# Patient Record
Sex: FEMALE | Race: WHITE | Employment: OTHER | URBAN - METROPOLITAN AREA
[De-identification: names, ages, dates, MRNs, and addresses within clinical notes are randomized per-mention and may not be internally consistent; named-entity substitution may affect disease eponyms.]

---

## 2020-11-08 ENCOUNTER — HOSPITAL ENCOUNTER (OUTPATIENT)
Age: 50
Discharge: ANOTHER HOSPITAL | End: 2020-11-08
Payer: COMMERCIAL

## 2020-11-08 VITALS
HEART RATE: 102 BPM | RESPIRATION RATE: 18 BRPM | TEMPERATURE: 97 F | DIASTOLIC BLOOD PRESSURE: 64 MMHG | SYSTOLIC BLOOD PRESSURE: 106 MMHG | BODY MASS INDEX: 21.69 KG/M2 | OXYGEN SATURATION: 98 % | HEIGHT: 66 IN | WEIGHT: 135 LBS

## 2020-11-08 DIAGNOSIS — R10.9 ABDOMINAL PAIN, ACUTE: ICD-10-CM

## 2020-11-08 DIAGNOSIS — R10.9 FLANK PAIN: ICD-10-CM

## 2020-11-08 DIAGNOSIS — R50.9 FEVER, UNSPECIFIED FEVER CAUSE: Primary | ICD-10-CM

## 2020-11-08 PROCEDURE — 99202 OFFICE O/P NEW SF 15 MIN: CPT | Performed by: NURSE PRACTITIONER

## 2020-11-08 PROCEDURE — 81002 URINALYSIS NONAUTO W/O SCOPE: CPT | Performed by: NURSE PRACTITIONER

## 2020-11-08 PROCEDURE — 81025 URINE PREGNANCY TEST: CPT | Performed by: NURSE PRACTITIONER

## 2020-11-08 RX ORDER — NITROFURANTOIN 25; 75 MG/1; MG/1
CAPSULE ORAL
COMMUNITY
Start: 2020-11-06

## 2020-11-08 NOTE — ED NOTES
Per provider recommendation pt will go to [facility] for further evaluation of symptoms. No acute distress noted leaving IC stable.

## 2020-11-08 NOTE — ED INITIAL ASSESSMENT (HPI)
Per pt diagnosed with UTI on Friday taking antibiotics but now having right flank pain and fevers. Pt also reports has been having right groin pain for a month.

## 2020-11-08 NOTE — ED PROVIDER NOTES
Patient presents with:  Urinary Symptoms      HPI:     Homer Michael is a 53year old female who presents with a chief complaint of dysuria, urgency, right-sided abdominal discomfort, and right flank pain that started a few days ago.   She states she actually had Not on file    Lifestyle      Physical activity        Days per week: Not on file        Minutes per session: Not on file      Stress: Not on file    Relationships      Social connections        Talks on phone: Not on file        Gets together: Not on file the past 10 hour(s))   [facility] POCT URINALYSIS DIPSTICK    Collection Time: 11/08/20  8:55 AM   Result Value Ref Range    Urine Color Dark yellow Yellow    Urine Clarity Clear Clear    Specific Gravity, Urine 1.010 1.005 - 1.030    PH, Urine 6.5 5.0 - 8.0    P

## 2021-12-17 ENCOUNTER — LAB REQUISITION (OUTPATIENT)
Dept: LAB | Facility: HOSPITAL | Age: 51
End: 2021-12-17
Payer: COMMERCIAL

## 2021-12-17 DIAGNOSIS — Z00.00 ENCOUNTER FOR GENERAL ADULT MEDICAL EXAMINATION WITHOUT ABNORMAL FINDINGS: ICD-10-CM

## 2021-12-17 PROCEDURE — 80053 COMPREHEN METABOLIC PANEL: CPT | Performed by: FAMILY MEDICINE

## 2021-12-17 PROCEDURE — 80061 LIPID PANEL: CPT | Performed by: FAMILY MEDICINE

## 2021-12-17 PROCEDURE — 85025 COMPLETE CBC W/AUTO DIFF WBC: CPT | Performed by: FAMILY MEDICINE

## 2022-04-23 ENCOUNTER — HOSPITAL ENCOUNTER (OUTPATIENT)
Age: 52
Discharge: HOME | End: 2022-04-23
Payer: COMMERCIAL

## 2022-04-23 VITALS
RESPIRATION RATE: 16 BRPM | WEIGHT: 135 LBS | HEIGHT: 66 IN | DIASTOLIC BLOOD PRESSURE: 62 MMHG | BODY MASS INDEX: 21.69 KG/M2 | HEART RATE: 61 BPM | TEMPERATURE: 99 F | OXYGEN SATURATION: 100 % | SYSTOLIC BLOOD PRESSURE: 108 MMHG

## 2022-04-23 DIAGNOSIS — N30.90 CYSTITIS: Primary | ICD-10-CM

## 2022-04-23 LAB
BILIRUB UR QL STRIP: NEGATIVE
CLARITY UR: CLEAR
COLOR UR: YELLOW
GLUCOSE UR STRIP-MCNC: NEGATIVE MG/DL
HGB UR QL STRIP: NEGATIVE
KETONES UR STRIP-MCNC: NEGATIVE MG/DL
NITRITE UR QL STRIP: POSITIVE
PH UR STRIP: 8.5 [PH]
SP GR UR STRIP: 1.01
UROBILINOGEN UR STRIP-ACNC: <2 MG/DL

## 2022-04-23 PROCEDURE — 87186 SC STD MICRODIL/AGAR DIL: CPT | Performed by: NURSE PRACTITIONER

## 2022-04-23 PROCEDURE — 87086 URINE CULTURE/COLONY COUNT: CPT | Performed by: NURSE PRACTITIONER

## 2022-04-23 PROCEDURE — 87077 CULTURE AEROBIC IDENTIFY: CPT | Performed by: NURSE PRACTITIONER

## 2022-04-23 RX ORDER — CEPHALEXIN 500 MG/1
500 CAPSULE ORAL 2 TIMES DAILY
Qty: 14 CAPSULE | Refills: 0 | Status: SHIPPED | OUTPATIENT
Start: 2022-04-23 | End: 2022-04-30

## 2022-04-23 NOTE — ED INITIAL ASSESSMENT (HPI)
Pt presents with urinary urgency, frequency and low pelvic pain x 24 hours. No low back pain reported. No vaginal discharge.      Pt reports taking a home UTI test - \"It was positive\", per pt

## 2022-07-14 ENCOUNTER — HOSPITAL ENCOUNTER (OUTPATIENT)
Age: 52
Discharge: HOME | End: 2022-07-14
Payer: COMMERCIAL

## 2022-07-14 ENCOUNTER — APPOINTMENT (OUTPATIENT)
Dept: ULTRASOUND IMAGING | Age: 52
End: 2022-07-14
Attending: NURSE PRACTITIONER
Payer: COMMERCIAL

## 2022-07-14 VITALS
RESPIRATION RATE: 20 BRPM | SYSTOLIC BLOOD PRESSURE: 133 MMHG | OXYGEN SATURATION: 100 % | HEART RATE: 77 BPM | TEMPERATURE: 99 F | DIASTOLIC BLOOD PRESSURE: 92 MMHG

## 2022-07-14 DIAGNOSIS — S86.111A RUPTURE OF MEDIAL HEAD OF RIGHT GASTROCNEMIUS, INITIAL ENCOUNTER: Primary | ICD-10-CM

## 2022-07-14 PROCEDURE — 93971 EXTREMITY STUDY: CPT | Performed by: NURSE PRACTITIONER

## 2022-07-14 NOTE — ED INITIAL ASSESSMENT (HPI)
Pt came in due to right leg swelling and pain for the past 4 days. Pt is very active and practices martial arts and is unsure if she injured herself. Pt has easy non labored respirations.

## 2024-09-03 ENCOUNTER — OFFICE VISIT (OUTPATIENT)
Dept: FAMILY MEDICINE CLINIC | Facility: CLINIC | Age: 54
End: 2024-09-03

## 2024-09-03 VITALS
DIASTOLIC BLOOD PRESSURE: 70 MMHG | BODY MASS INDEX: 23.16 KG/M2 | WEIGHT: 139 LBS | OXYGEN SATURATION: 99 % | HEIGHT: 65 IN | HEART RATE: 70 BPM | TEMPERATURE: 97 F | SYSTOLIC BLOOD PRESSURE: 120 MMHG

## 2024-09-03 DIAGNOSIS — Z00.00 ENCOUNTER FOR ANNUAL PHYSICAL EXAM: ICD-10-CM

## 2024-09-03 DIAGNOSIS — Z12.31 ENCOUNTER FOR SCREENING MAMMOGRAM FOR MALIGNANT NEOPLASM OF BREAST: ICD-10-CM

## 2024-09-03 DIAGNOSIS — D64.9 ANEMIA, UNSPECIFIED TYPE: ICD-10-CM

## 2024-09-03 DIAGNOSIS — N92.6 IRREGULAR MENSES: ICD-10-CM

## 2024-09-03 DIAGNOSIS — Z12.11 COLON CANCER SCREENING: ICD-10-CM

## 2024-09-03 DIAGNOSIS — Z76.89 ENCOUNTER TO ESTABLISH CARE: Primary | ICD-10-CM

## 2024-09-03 PROCEDURE — 99386 PREV VISIT NEW AGE 40-64: CPT

## 2024-09-03 RX ORDER — NORETHINDRONE ACETATE 5 MG
5 TABLET ORAL DAILY
Qty: 30 TABLET | Refills: 0 | Status: SHIPPED | OUTPATIENT
Start: 2024-09-03

## 2024-09-03 NOTE — PROGRESS NOTES
Bernard Alfredo is a 54 year old female.  Chief Complaint   Patient presents with    Establish Care     Here to establish care. Last pap 11/2023 from [facility].      HPI:   Bernard Alfredo presented to the clinic for annual physical examination. No acute concerns. No changes in family/personal history. Normal Sleep. Normal appetite. Balanced diet. Normal BM/Urination. Physically active.  Sexually active. LMP 8/21/24, irregular cycles. Bleeding every 11-20 days. on norethindrone 0.35mg daily. Follows with OB, interested in second opinion to discuss treatment options for heavy menses. No other daily medications.     Current Outpatient Medications   Medication Sig Dispense Refill    norethindrone-ethinyl estradiol 0.5-35 MG-MCG Oral Tab Take 1 tablet by mouth daily.        History reviewed. No pertinent past medical history.   History reviewed. No pertinent surgical history.   Social History:  Social History     Socioeconomic History    Marital status:    Tobacco Use    Smoking status: Never    Smokeless tobacco: Never   Substance and Sexual Activity    Alcohol use: Yes     Comment: socially    Drug use: Never    Sexual activity: Yes     Social Determinants of Health      Received from [facility]    Social Connections    Received from [facility]    Housing Stability      History reviewed. No pertinent family history.   Allergies   Allergen Reactions    Adhesive Tape UNKNOWN        REVIEW OF SYSTEMS:   Review of Systems   Constitutional:  Negative for activity change.   Respiratory:  Negative for chest tightness and shortness of breath.    Cardiovascular:  Negative for chest pain and palpitations.   Neurological: Negative.    Psychiatric/Behavioral: Negative.     All other systems reviewed and are negative.     Wt Readings from Last 5 Encounters:   09/03/24 139 lb (63 kg)   04/23/22 135 lb (61.2 kg)   11/08/20 135 lb (61.2 kg)     Body mass index is 23.13 kg/m².      EXAM:    /70 (BP Location: Right arm, Patient Position: Sitting, Cuff Size: adult)   Pulse 70   Temp 97.3 °F (36.3 °C)   Ht 5' 5\" (1.651 m)   Wt 139 lb (63 kg)   SpO2 99%   BMI 23.13 kg/m²   Physical Exam  Vitals reviewed.   Constitutional:       Appearance: Normal appearance.   HENT:      Head: Normocephalic and atraumatic.      Right Ear: Tympanic membrane normal.      Left Ear: Tympanic membrane normal.      Mouth/Throat:      Mouth: Mucous membranes are moist.      Pharynx: Oropharynx is clear.   Eyes:      Pupils: Pupils are equal, round, and reactive to light.   Cardiovascular:      Rate and Rhythm: Normal rate and regular rhythm.      Pulses: Normal pulses.      Heart sounds: Normal heart sounds.   Pulmonary:      Effort: Pulmonary effort is normal.      Breath sounds: Normal breath sounds.   Chest:      Comments: Deferred.   Abdominal:      General: Abdomen is flat. There is no distension.      Palpations: Abdomen is soft. There is no mass.      Tenderness: There is no abdominal tenderness. There is no guarding or rebound.      Hernia: No hernia is present.   Musculoskeletal:         General: Normal range of motion.      Cervical back: Normal range of motion.   Skin:     General: Skin is warm.   Neurological:      General: No focal deficit present.      Mental Status: She is alert and oriented to person, place, and time.   Psychiatric:         Mood and Affect: Mood normal.         Behavior: Behavior normal.            ASSESSMENT AND PLAN:   (Z76.89) Encounter to establish care  (primary encounter diagnosis)  (Z00.00) Encounter for annual physical exam  Plan: Derm Referral - In Network, OBG Referral - In         Network        -will send previous records, believes UTD vaccinations.   - tobacco, alcohol, illicit drug use discouraged  - safe sexual practices advised  - Reinforced healthy diet, lifestyle, and exercise.  - Past Medical/Social/Family histories reviewed  - Reinforced healthy foods, dental  hygiene, limited screen time, and regular physical activity.   - advised use of seat belts, helmets, and other protective gear as indicated for activities   - Regular dental visits recommended   - Regular eye exams recommended     Health Maintenance   Topic Date Due    Pap Smear  Never done   Will send records. States last 2023 - normal.     Health Maintenance   Topic Date Due    Mammogram  Never done      Health Maintenance   Topic Date Due    Colorectal Cancer Screening  Never done          (Z12.31) Encounter for screening mammogram for malignant neoplasm of breast  Plan: [facility] 2D+3D SCREENING BILAT         (CPT=77067/51728)        Order placed. Advised to schedule.     (Z12.11) Colon cancer screening  Plan: discussed options, last completed cologard, believes 3  years ago. Will fax results to determine next steps.     (D64.9) Anemia, unspecified type  (N92.6) Irregular menses  Plan: OBG Referral - In Network, norethindrone 5 MG         Oral Tab        Patient with irregular, heavy menses. Typically 11-20 days in duration. Bleeds for 7 days. Follows with OB has been recommended hormonal treatment and D and C. Prefers second opinion. Referral placed to OB to discuss. Will start norethindrone 5mg x 1 month to regular menses. Follow up with OB to discuss further treatment.       Follow up annual physical or sooner if needed     The patient indicates understanding of these issues and agrees to the plan.  Chaperone offered to the patient prior to examination    This note was prepared using Dragon Medical voice recognition dictation software. As a result errors may occur. When identified these errors have been corrected. While every attempt is made to correct errors during dictation discrepancies may still exist.

## 2024-09-26 DIAGNOSIS — N92.6 IRREGULAR MENSES: ICD-10-CM

## 2024-09-26 NOTE — TELEPHONE ENCOUNTER
norethindrone 5 MG Oral Tab, Take 1 tablet (5 mg total) by mouth daily., Disp: 30 tablet, Rfl: 0

## 2024-09-27 DIAGNOSIS — N92.6 IRREGULAR MENSES: ICD-10-CM

## 2024-09-27 RX ORDER — NORETHINDRONE ACETATE 5 MG
5 TABLET ORAL DAILY
Qty: 30 TABLET | Refills: 0 | OUTPATIENT
Start: 2024-09-27

## 2024-09-27 NOTE — TELEPHONE ENCOUNTER
[employer] message with recommendation sent to patient due for mammogram.         Protocol Failed/ No Protocol    Requested Prescriptions   Pending Prescriptions Disp Refills    NORETHINDRONE 5 MG Oral Tab [Pharmacy Med Name: NORETHINDRONE 5 MG TABLET] 30 tablet 0     Sig: Take 1 tablet (5 mg total) by mouth daily.       Gynecology Medication Protocol Failed - 9/27/2024  5:52 AM        Failed - PASS--PENDING LAST PAP WNL--VIA MANUAL LOOKUP        Failed - Mammogram in past 12 months        Passed - Physical or Pelvic/Breast in past 12 or next 3 mos--VIA MANUAL LOOKUP               Recent Outpatient Visits              3 weeks ago Encounter to [facility] Laurita Potter APRN    Office Visit

## 2024-09-29 DIAGNOSIS — N92.6 IRREGULAR MENSES: ICD-10-CM

## 2024-09-29 RX ORDER — NORETHINDRONE 5 MG/1
5 TABLET ORAL DAILY
Qty: 30 TABLET | Refills: 0 | Status: CANCELLED | OUTPATIENT
Start: 2024-09-29

## 2024-09-29 NOTE — TELEPHONE ENCOUNTER
Per chart patient scheduled with GYN 70/16/24.  Please advise.    Please respond directly to the patient if no additional staff support is required.

## 2024-09-30 RX ORDER — NORETHINDRONE ACETATE 5 MG
5 TABLET ORAL DAILY
Qty: 30 TABLET | Refills: 0 | Status: SHIPPED | OUTPATIENT
Start: 2024-09-30

## 2024-10-11 ENCOUNTER — MED REC SCAN ONLY (OUTPATIENT)
Dept: FAMILY MEDICINE CLINIC | Facility: CLINIC | Age: 54
End: 2024-10-11

## 2024-10-22 ENCOUNTER — TELEPHONE (OUTPATIENT)
Dept: FAMILY MEDICINE CLINIC | Facility: CLINIC | Age: 54
End: 2024-10-22

## 2024-10-22 NOTE — TELEPHONE ENCOUNTER
Left message to request medical records to be faxed to us, [phone], as we are unable to download her records with the link we've received from [employer].

## 2024-10-24 ENCOUNTER — HOSPITAL ENCOUNTER (OUTPATIENT)
Dept: MAMMOGRAPHY | Age: 54
Discharge: HOME | End: 2024-10-24
Payer: COMMERCIAL

## 2024-10-24 DIAGNOSIS — Z12.31 ENCOUNTER FOR SCREENING MAMMOGRAM FOR MALIGNANT NEOPLASM OF BREAST: ICD-10-CM

## 2024-10-24 PROCEDURE — 77067 SCR MAMMO BI INCL CAD: CPT

## 2024-10-24 PROCEDURE — 77063 BREAST TOMOSYNTHESIS BI: CPT

## 2024-10-25 DIAGNOSIS — N92.6 IRREGULAR MENSES: ICD-10-CM

## 2024-10-25 NOTE — TELEPHONE ENCOUNTER
norethindrone 5 MG Oral Tab 30 tablet 0 9/30/2024 --   Sig:   Take 1 tablet (5 mg total) by mouth daily. Refills per OBGYN         Per last refill APN preferred refills come from GYN.

## 2024-10-28 RX ORDER — NORETHINDRONE 5 MG/1
5 TABLET ORAL DAILY
Qty: 30 TABLET | Refills: 0 | Status: SHIPPED | OUTPATIENT
Start: 2024-10-28

## 2024-10-28 NOTE — TELEPHONE ENCOUNTER
Spoke to patient. She has not followed up with OB yet. She has an appointment in November. She is requesting a one month supply until she can see OB. Medication pended for your review and approval.    Will route to pod mate as Laurita is out of the office.

## 2024-11-28 ENCOUNTER — PATIENT MESSAGE (OUTPATIENT)
Dept: OBGYN CLINIC | Facility: CLINIC | Age: 54
End: 2024-11-28

## 2024-11-30 NOTE — PROGRESS NOTES
GYNE PROBLEM VISIT     HPI:   Bernard Centeno is a 54 year old  female presenting to discuss abnormal uterine bleeding.     She reports heavy menses since . They became more painful at around this time as well. She had an ultrasound in  which she was told was normal. Report not available for review. She continued to bleed heavily and had an EMB which patient states was negative, pathology results not available for review. She was eventually placed on norethindrone for control of her symptoms. She reports bleeding initially improved but she continues to have what appears to be a menstrual period despite taking norethindrone continuously. She also continues to spot, sometimes for most of the month. She reports occasional hot flashes at night. Denies any vulvovaginal symptoms, mood or sleep changes.     Past Medical History:    Migraine with aura       Past Surgical History:   Procedure Laterality Date                Excis bartholin gland/cyst Left     left bartgolin gland removed    Tubal ligation         History reviewed. No pertinent family history.    Medications (Active prior to today's visit):  Current Outpatient Medications   Medication Sig Dispense Refill    norethindrone 5 MG Oral Tab Take 1 tablet (5 mg total) by mouth daily. 30 tablet 3    NORETHINDRONE 5 MG Oral Tab TAKE 1 TABLET (5 MG TOTAL) BY MOUTH DAILY. REFILLS PER OBGYN 30 tablet 0       Allergies:  Allergies[1]    /72   Ht 65\"   Wt 145 lb 11.2 oz (66.1 kg)   LMP 11/15/2024   BMI 24.25 kg/m²     PHYSICAL EXAM:   GENERAL: Well developed, well nourished, in no apparent distress  ABDOMEN: Soft, non distended, non tender, no masses, well-healed Pfannenstiel incision   GYNE/:   External Genitalia: normal, no lesions, good perineal support           Vagina: normal mucosa, no lesions, scant brown discharge   Uterus:retroverted, slightly enlarged, non-tender                 Cervix: normal os, no lesions or  bleeding                    R/V: normal perineum, no hemorrhoids  EXTREMITIES: nontender without edema      ASSESSMENT/PLAN:       #AUB  -ddx includes anovulatory bleeding, structural pathology, malignancy  -given that it is unclear if patient is menopausal, FSH ordered to best guide therapy  -TVUS ordered to rule out structural pathology such as polyp  -she may continue norethindrone in the meantime, refills sent to pharmacy    Follow up pending lab/ultrasound findings      Lilly Manuel DO               [1]   Allergies  Allergen Reactions    Adhesive Tape UNKNOWN

## 2024-12-03 ENCOUNTER — OFFICE VISIT (OUTPATIENT)
Dept: OBGYN CLINIC | Facility: CLINIC | Age: 54
End: 2024-12-03
Payer: COMMERCIAL

## 2024-12-03 VITALS
WEIGHT: 145.69 LBS | SYSTOLIC BLOOD PRESSURE: 118 MMHG | DIASTOLIC BLOOD PRESSURE: 72 MMHG | BODY MASS INDEX: 24.27 KG/M2 | HEIGHT: 65 IN

## 2024-12-03 DIAGNOSIS — N92.6 IRREGULAR MENSES: ICD-10-CM

## 2024-12-03 DIAGNOSIS — N93.9 ABNORMAL UTERINE BLEEDING: ICD-10-CM

## 2024-12-03 DIAGNOSIS — Z12.4 SCREENING FOR CERVICAL CANCER: Primary | ICD-10-CM

## 2024-12-03 PROCEDURE — 99213 OFFICE O/P EST LOW 20 MIN: CPT | Performed by: OBSTETRICS & GYNECOLOGY

## 2024-12-03 RX ORDER — NORETHINDRONE 5 MG/1
5 TABLET ORAL DAILY
Qty: 30 TABLET | Refills: 3 | Status: SHIPPED | OUTPATIENT
Start: 2024-12-03 | End: 2024-12-03

## 2024-12-03 RX ORDER — NORETHINDRONE 5 MG/1
5 TABLET ORAL DAILY
Qty: 30 TABLET | Refills: 3 | Status: SHIPPED | OUTPATIENT
Start: 2024-12-03 | End: 2024-12-16

## 2024-12-03 RX ORDER — NORETHINDRONE 5 MG/1
5 TABLET ORAL DAILY
Qty: 30 TABLET | Refills: 0 | OUTPATIENT
Start: 2024-12-03

## 2024-12-03 NOTE — TELEPHONE ENCOUNTER
Pharmacy    [id number] - [place], [place] - [address] AT [address] OF [address], [phone], [phone]        Disp Refills Start End    norethindrone 5 MG Oral Tab 30 tablet 3 12/3/2024 --    Sig - Route: Take 1 tablet (5 mg total) by mouth daily. - Oral    Sent to pharmacy as: Norethindrone Acetate 5 MG Oral Tablet (Aygestin)    E-Prescribing Status: Receipt confirmed by pharmacy (12/3/2024  2:40 PM CST)

## 2024-12-09 ENCOUNTER — PATIENT MESSAGE (OUTPATIENT)
Dept: OBGYN CLINIC | Facility: CLINIC | Age: 54
End: 2024-12-09

## 2024-12-10 ENCOUNTER — LAB ENCOUNTER (OUTPATIENT)
Dept: LAB | Age: 54
End: 2024-12-10
Attending: OBSTETRICS & GYNECOLOGY
Payer: COMMERCIAL

## 2024-12-10 ENCOUNTER — ULTRASOUND ENCOUNTER (OUTPATIENT)
Dept: OBGYN CLINIC | Facility: CLINIC | Age: 54
End: 2024-12-10
Payer: COMMERCIAL

## 2024-12-10 DIAGNOSIS — N93.9 ABNORMAL UTERINE BLEEDING: ICD-10-CM

## 2024-12-10 LAB — FSH SERPL-ACNC: 14.6 MIU/ML

## 2024-12-10 PROCEDURE — 76830 TRANSVAGINAL US NON-OB: CPT | Performed by: OBSTETRICS & GYNECOLOGY

## 2024-12-10 PROCEDURE — 83001 ASSAY OF GONADOTROPIN (FSH): CPT

## 2024-12-10 PROCEDURE — 76856 US EXAM PELVIC COMPLETE: CPT | Performed by: OBSTETRICS & GYNECOLOGY

## 2024-12-10 PROCEDURE — 36415 COLL VENOUS BLD VENIPUNCTURE: CPT

## 2024-12-12 ENCOUNTER — TELEPHONE (OUTPATIENT)
Dept: OBGYN CLINIC | Facility: CLINIC | Age: 54
End: 2024-12-12

## 2024-12-12 NOTE — TELEPHONE ENCOUNTER
RN called patient and stated that Dr. Manuel is out of the office and will return to the office next week. RN informed patient and RN will follow up with provider on Monday. Patient verbalized understanding.

## 2024-12-12 NOTE — TELEPHONE ENCOUNTER
Incoming call from patient stating that Dr Manuel asked when was a good time to call, patient says Dr can call her any time today or tomorrow.

## 2024-12-12 NOTE — TELEPHONE ENCOUNTER
Pt called stating she was asked to call the office about next steps after US that was done at 12/10.    Pt stated she is working so she will try calling back to speak with office.    Please advise

## 2024-12-16 ENCOUNTER — TELEMEDICINE (OUTPATIENT)
Dept: OBGYN CLINIC | Facility: CLINIC | Age: 54
End: 2024-12-16
Payer: COMMERCIAL

## 2024-12-16 ENCOUNTER — TELEPHONE (OUTPATIENT)
Dept: OBGYN CLINIC | Facility: CLINIC | Age: 54
End: 2024-12-16

## 2024-12-16 DIAGNOSIS — D25.0 FIBROIDS, SUBMUCOSAL: Primary | ICD-10-CM

## 2024-12-16 DIAGNOSIS — N93.9 ABNORMAL UTERINE BLEEDING: ICD-10-CM

## 2024-12-16 RX ORDER — NORETHINDRONE 5 MG/1
10 TABLET ORAL DAILY
Qty: 60 TABLET | Refills: 3 | Status: SHIPPED | OUTPATIENT
Start: 2024-12-16

## 2024-12-16 NOTE — PROGRESS NOTES
H&P    HPI:   Bernard Centeno is a 54 year old  female presenting for virtual visit to follow up workup of AUB. She confirmed her identity and consented verbally to telephone visit.    Bleeding somewhat improved since she increased norethrindrone dosing to 10mg daily.     Past Medical History:    Migraine with aura       Past Surgical History:   Procedure Laterality Date                Excis bartholin gland/cyst Left     left bartgolin gland removed    Tubal ligation      Umbilical hernia repair         History reviewed. No pertinent family history.    Medications (Active prior to today's visit):  Current Outpatient Medications   Medication Sig Dispense Refill    norethindrone 5 MG Oral Tab Take 2 tablets (10 mg total) by mouth daily. Take 1 tablet (5 mg total) by mouth daily, skipping placebo pills. 60 tablet 3       Allergies:  Allergies[1]    LMP 11/15/2024     IMAGING      TVUS 2024    Findings:     Uterus: 9.13 x 5.94 x 4.88cm   Endometrial thickness: 15.90mm     Left ovary:   Length 2.99 cm   Width 2.10 cm   Height 1.45 cm   Volume 4.767     Right ovary:   Length 3.09 cm   Width 2.34 cm   Height 1.74 cm   Volume 6.588     Uterine Fibroids   1. Anterior subserosal 0.89 x 0.88 cm   2. Anterior intramural 0.61 x 0.65 cm   3. Submucosal 2.06 x 1.61 cm     Impression: slightly enlarged uterus with several fibroids, the largest of which is submucosal. The endometrial thickness is 15mm which may be normal in a pre-menopausal female. Bilateral ovaries normal in appearance.     ASSESSMENT/PLAN:       #Submucosal fibroid  -reviewed imaging results c/w submucosal fibroid, discussed this is likely source of her bleeding and this is unlikely to resolve with medical therapy alone  -discussed definitive management via hysteroscopy  -discussed risk of bleeding, infection, uterine perforation causing injury to bowel/bladder/vasculature and that may require diagnostic laparoscopy or more  extensive procedure if injury is noted  -discussed anticipated recovery time, all questions answered  -will tentatively schedule for 1/24/2025      Lilly Manuel DO               [1]   Allergies  Allergen Reactions    Adhesive Tape UNKNOWN

## 2024-12-16 NOTE — TELEPHONE ENCOUNTER
Schedules 12/16 11am      ----- Message from Lilly Manuel sent at 12/16/2024  9:45 AM CST -----  Please schedule the following surgery:    Procedure: hysteroscopic myomectomy   Assist: none  Date: 1/24/2025                               Time Requested: first start   Dx: submucosal fibroid, abnormal uterine bleeding  Pre-op appt: completed today  Admission type: none  Department of discharge(SDS/Floor): SDS  Expected length of stay: 0  Procedure length time (please enter amount you are requesting): 60 min  Recovery time (patients always ask): 2wk  Medical Clearance: (Y/N) no  Post- Op f/u appt time frame: 2wk     Pre-op orders (choose one): abx not indicated         ALL Medicaid/including BCBS community: Tubal/ Hyst form MUST be signed (30 days):      Message to nurses: If there is a way to get Myosure I would love that.. otherwise Tru[employer] is fine.

## 2025-01-17 ENCOUNTER — PATIENT MESSAGE (OUTPATIENT)
Dept: OBGYN CLINIC | Facility: CLINIC | Age: 55
End: 2025-01-17

## 2025-01-21 RX ORDER — MULTIVIT-MIN/IRON FUM/FOLIC AC 7.5 MG-4
1 TABLET ORAL DAILY
COMMUNITY

## 2025-01-21 RX ORDER — ACETAMINOPHEN 160 MG
2000 TABLET,DISINTEGRATING ORAL DAILY
COMMUNITY

## 2025-01-23 ENCOUNTER — PATIENT MESSAGE (OUTPATIENT)
Dept: OBGYN CLINIC | Facility: CLINIC | Age: 55
End: 2025-01-23

## 2025-01-23 NOTE — TELEPHONE ENCOUNTER
Informed Dr. Manuel and:  She can wait and see how she feels tomorrow. If not improved we can reschedule 01/30

## 2025-01-23 NOTE — DISCHARGE INSTRUCTIONS
HOME INSTRUCTIONS  [facility] HOME CARE INSTRUCTIONS: POST-OP ANESTHESIA  The medication that you received for sedation or general anesthesia can last up to 24 hours. Your judgment and reflexes may be altered, even if you feel like your normal self.      We Recommend:   Do not drive any motor vehicle or bicycle   Avoid mowing the lawn, playing sports, or working with power tools/applicances (power saws, electric knives or mixers)   That you have someone stay with you on your first night home   Do not drink alcohol or take sleeping pills or tranquilizers   Do not sign legal documents within 24 hours of your procedure   If you had a nerve block for your surgery, take extra care not to put any pressure on your arm or hand for 24 hours    It is normal:  For you to have a sore throat if you had a breathing tube during surgery (while you were asleep!). The sore throat should get better within 48 hours. You can gargle with warm salt water (1/2 tsp in 4 oz warm water) or use a throat lozenge for comfort  To feel muscle aches or soreness especially in the abdomen, chest or neck. The achy feeling should go away in the next 24 hours  To feel weak, sleepy or \"wiped out\". Your should start feeling better in the next 24 hours.   To experience mild discomforts such as sore lip or tongue, headache, cramps, gas pains or a bloated feeling in your abdomen.   To experience mild back pain or soreness for a day or two if you had spinal or epidural anesthesia.   If you had laparoscopic surgery, to feel shoulder pain or discomfort on the day of surgery.   For some patients to have nausea after surgery/anesthesia    If you feel nausea or experience vomiting:   Try to move around less.   Eat less than usual or drink only liquids until the next morning   Nausea should resolve in about 24 hours    If you have a problem when you are at home:    Call your surgeons office   Discharge Instructions: After Your Surgery  You’ve just had surgery. During  surgery, you were given medicine called anesthesia to keep you relaxed and free of pain. After surgery, you may have some pain or nausea. This is common. Here are some tips for feeling better and getting well after surgery.   Going home  Your healthcare provider will show you how to take care of yourself when you go home. They'll also answer your questions. Have an adult family member or friend drive you home. For the first 24 hours after your surgery:   Don't drive or use heavy equipment.  Don't make important decisions or sign legal papers.  Take medicines as directed.  Don't drink alcohol.  Have someone stay with you, if needed. They can watch for problems and help keep you safe.  Be sure to go to all follow-up visits with your healthcare provider. And rest after your surgery for as long as your provider tells you to.   Coping with pain  If you have pain after surgery, pain medicine will help you feel better. Take it as directed, before pain becomes severe. Also, ask your healthcare provider or pharmacist about other ways to control pain. This might be with heat, ice, or relaxation. And follow any other instructions your surgeon or nurse gives you.      Stay on schedule with your medicine.     Tips for taking pain medicine  To get the best relief possible, remember these points:   Pain medicines can upset your stomach. Taking them with a little food may help.  Most pain relievers taken by mouth need at least 20 to 30 minutes to start to work.  Don't wait till your pain becomes severe before you take your medicine. Try to time your medicine so that you can take it before starting an activity. This might be before you get dressed, go for a walk, or sit down for dinner.  Constipation is a common side effect of some pain medicines. Call your healthcare provider before taking any medicines such as laxatives or stool softeners to help ease constipation. Also ask if you should skip any foods. Drinking lots of fluids and  eating foods such as fruits and vegetables that are high in fiber can also help. Remember, don't take laxatives unless your surgeon has prescribed them.  Drinking alcohol and taking pain medicine can cause dizziness and slow your breathing. It can even be deadly. Don't drink alcohol while taking pain medicine.  Pain medicine can make you react more slowly to things. Don't drive or run machinery while taking pain medicine.  Your healthcare provider may tell you to take acetaminophen to help ease your pain. Ask them how much you're supposed to take each day. Acetaminophen or other pain relievers may interact with your prescription medicines or other over-the-counter (OTC) medicines. Some prescription medicines have acetaminophen and other ingredients in them. Using both prescription and OTC acetaminophen for pain can cause you to accidentally overdose. Read the labels on your OTC medicines with care. This will help you to clearly know the list of ingredients, how much to take, and any warnings. It may also help you not take too much acetaminophen. If you have questions or don't understand the information, ask your pharmacist or healthcare provider to explain it to you before you take the OTC medicine.   Managing nausea  Some people have an upset stomach (nausea) after surgery. This is often because of anesthesia, pain, or pain medicine, less movement of food in the stomach, or the stress of surgery. These tips will help you handle nausea and eat healthy foods as you get better. If you were on a special food plan before surgery, ask your healthcare provider if you should follow it while you get better. Check with your provider on how your eating should progress. It may depend on the surgery you had. These general tips may help:   Don't push yourself to eat. Your body will tell you when to eat and how much.  Start off with clear liquids and soup. They're easier to digest.  Next try semi-solid foods as you feel ready.  These include mashed potatoes, applesauce, and gelatin.  Slowly move to solid foods. Don’t eat fatty, rich, or spicy foods at first.  Don't force yourself to have 3 large meals a day. Instead eat smaller amounts more often.  Take pain medicines with a small amount of solid food, such as crackers or toast. This helps prevent nausea.  When to call your healthcare provider  Call your healthcare provider right away if you have any of these:   You still have too much pain, or the pain gets worse, after taking the medicine. The medicine may not be strong enough. Or there may be a complication from the surgery.  You feel too sleepy, dizzy, or groggy. The medicine may be too strong.  Side effects such as nausea or vomiting. Your healthcare provider may advise taking other medicines to .  Skin changes such as rash, itching, or hives. This may mean you have an allergic reaction. Your provider may advise taking other medicines.  The incision looks different (for instance, part of it opens up).  Bleeding or fluid leaking from the incision site, and weren't told to expect that.  Fever of 100.4°F (38°C) or higher, or as directed by your provider.  Call 911  Call 911 right away if you have:   Trouble breathing  Facial swelling    If you have obstructive sleep apnea   You were given anesthesia medicine during surgery to keep you comfortable and free of pain. After surgery, you may have more apnea spells because of this medicine and other medicines you were given. The spells may last longer than normal.    At home:  Keep using the continuous positive airway pressure (CPAP) device when you sleep. Unless your healthcare provider tells you not to, use it when you sleep, day or night. CPAP is a common device used to treat obstructive sleep apnea.  Talk with your provider before taking any pain medicine, muscle relaxants, or sedatives. Your provider will tell you about the possible dangers of taking these medicines.  Contact your  provider if your sleeping changes a lot even when taking medicines as directed.  [employer] last reviewed this educational content on 10/1/2021  © 8844-7155 [employer]. All rights reserved. This information is not intended as a substitute for professional medical care. Always follow your healthcare professional's instructions.      Post Operative Home Care Instructions     We hope you were pleased with your care at [facility].  We wish you the best outcome and overall experience.  These instructions will help to minimize pain, limit the risk for an infection, and improve the likelihood of a successful recovery.    What to Expect:  Abdominal cramping   Vaginal bleeding for about 1-2 weeks   Constipation is common after surgery. Please keep up with Colace twice per day and Miralax as needed.     Over-The-Counter Medication  Non-prescription anti-inflammatory medications can also help to ease the pain.  You may take Aleve, Tylenol or Ibuprofen   Colace or Metamucil for Constipation  Drink a full glass of water with oral medication and take as directed.    Bathing/Showers  You may resume showers  No baths, swimming, hot tubs for at least 8 weeks.     Home Medication  Resume your home medications as instructed    Diet   Resume your normal diet    Activity  Refrain from vaginal intercourse, vaginal suppositories, tampon use or douches until after your follow up visit   No exercising for 1-2 weeks  You may climb stairs     Return to Work or School  You may return to work in a few days   Contact your gynecologist's office, if you need a medical release. ([phone])    Driving  Avoid driving if taking narcotics    Follow-up Appointment with Your Obstetrician  Call your Gynecologist's office today for a staple removal/incision check appointment and/or follow up appointment.   The number is [phone].  Verify your appointment date, day, time, and location.  At your office visit:  Your  progress will be evaluated, pathology will be reviewed, and any additional concerns and instructions will be discussed.    Questions or Concerns  Call your gynecologist's office if you experience the following:  Severe pain not controlled by pain medication  Foul smelling vaginal discharge  Heavy bleeding  Shortness of breath  Fever  Crying and periods of sadness that prevents you from caring for yourself   Burning sensation during urination or inability to urinate  Swelling, redness or abnormal warmth to your leg/calf  Please call [phone]. If your call is made after office hours, a physician will be available to help you.  There is always a provider covering our patients.    Thank you for coming to [facility] for your surgery.  The nurses, gynecologist, and the anesthesiologists try very hard to make sure you receive the best care possible.  Your trust in them as well as us is greatly appreciated.      The Providers of [facility]

## 2025-01-24 ENCOUNTER — HOSPITAL ENCOUNTER (OUTPATIENT)
Facility: HOSPITAL | Age: 55
Setting detail: HOSPITAL OUTPATIENT SURGERY
Discharge: HOME | End: 2025-01-24
Attending: OBSTETRICS & GYNECOLOGY | Admitting: OBSTETRICS & GYNECOLOGY
Payer: COMMERCIAL

## 2025-01-24 ENCOUNTER — ANESTHESIA (OUTPATIENT)
Dept: SURGERY | Facility: HOSPITAL | Age: 55
End: 2025-01-24
Payer: COMMERCIAL

## 2025-01-24 ENCOUNTER — ANESTHESIA EVENT (OUTPATIENT)
Dept: SURGERY | Facility: HOSPITAL | Age: 55
End: 2025-01-24
Payer: COMMERCIAL

## 2025-01-24 VITALS
OXYGEN SATURATION: 96 % | HEART RATE: 53 BPM | SYSTOLIC BLOOD PRESSURE: 111 MMHG | BODY MASS INDEX: 23.14 KG/M2 | DIASTOLIC BLOOD PRESSURE: 64 MMHG | HEIGHT: 66 IN | RESPIRATION RATE: 18 BRPM | WEIGHT: 144 LBS | TEMPERATURE: 99 F

## 2025-01-24 DIAGNOSIS — D25.0 FIBROIDS, SUBMUCOSAL: ICD-10-CM

## 2025-01-24 DIAGNOSIS — N93.9 ABNORMAL UTERINE BLEEDING: ICD-10-CM

## 2025-01-24 LAB — B-HCG UR QL: NEGATIVE

## 2025-01-24 PROCEDURE — 58561 HYSTEROSCOPY REMOVE MYOMA: CPT | Performed by: OBSTETRICS & GYNECOLOGY

## 2025-01-24 PROCEDURE — 0UB98ZZ EXCISION OF UTERUS, VIA NATURAL OR ARTIFICIAL OPENING ENDOSCOPIC: ICD-10-PCS | Performed by: OBSTETRICS & GYNECOLOGY

## 2025-01-24 RX ORDER — MORPHINE SULFATE 4 MG/ML
2 INJECTION, SOLUTION INTRAMUSCULAR; INTRAVENOUS EVERY 10 MIN PRN
Status: DISCONTINUED | OUTPATIENT
Start: 2025-01-24 | End: 2025-01-24

## 2025-01-24 RX ORDER — MIDAZOLAM HYDROCHLORIDE 1 MG/ML
INJECTION INTRAMUSCULAR; INTRAVENOUS AS NEEDED
Status: DISCONTINUED | OUTPATIENT
Start: 2025-01-24 | End: 2025-01-24 | Stop reason: SURG

## 2025-01-24 RX ORDER — NALOXONE HYDROCHLORIDE 0.4 MG/ML
0.08 INJECTION, SOLUTION INTRAMUSCULAR; INTRAVENOUS; SUBCUTANEOUS AS NEEDED
Status: DISCONTINUED | OUTPATIENT
Start: 2025-01-24 | End: 2025-01-24

## 2025-01-24 RX ORDER — DEXAMETHASONE SODIUM PHOSPHATE 4 MG/ML
VIAL (ML) INJECTION AS NEEDED
Status: DISCONTINUED | OUTPATIENT
Start: 2025-01-24 | End: 2025-01-24 | Stop reason: SURG

## 2025-01-24 RX ORDER — LIDOCAINE HYDROCHLORIDE 10 MG/ML
INJECTION, SOLUTION EPIDURAL; INFILTRATION; INTRACAUDAL; PERINEURAL AS NEEDED
Status: DISCONTINUED | OUTPATIENT
Start: 2025-01-24 | End: 2025-01-24 | Stop reason: SURG

## 2025-01-24 RX ORDER — MORPHINE SULFATE 10 MG/ML
6 INJECTION, SOLUTION INTRAMUSCULAR; INTRAVENOUS EVERY 10 MIN PRN
Status: DISCONTINUED | OUTPATIENT
Start: 2025-01-24 | End: 2025-01-24

## 2025-01-24 RX ORDER — SODIUM CHLORIDE, SODIUM LACTATE, POTASSIUM CHLORIDE, CALCIUM CHLORIDE 600; 310; 30; 20 MG/100ML; MG/100ML; MG/100ML; MG/100ML
INJECTION, SOLUTION INTRAVENOUS CONTINUOUS
Status: DISCONTINUED | OUTPATIENT
Start: 2025-01-24 | End: 2025-01-24

## 2025-01-24 RX ORDER — ACETAMINOPHEN 500 MG
1000 TABLET ORAL ONCE
Status: COMPLETED | OUTPATIENT
Start: 2025-01-24 | End: 2025-01-24

## 2025-01-24 RX ORDER — HYDROMORPHONE HYDROCHLORIDE 1 MG/ML
0.2 INJECTION, SOLUTION INTRAMUSCULAR; INTRAVENOUS; SUBCUTANEOUS EVERY 5 MIN PRN
Status: DISCONTINUED | OUTPATIENT
Start: 2025-01-24 | End: 2025-01-24

## 2025-01-24 RX ORDER — HYDROMORPHONE HYDROCHLORIDE 1 MG/ML
0.6 INJECTION, SOLUTION INTRAMUSCULAR; INTRAVENOUS; SUBCUTANEOUS EVERY 5 MIN PRN
Status: DISCONTINUED | OUTPATIENT
Start: 2025-01-24 | End: 2025-01-24

## 2025-01-24 RX ORDER — HYDROMORPHONE HYDROCHLORIDE 1 MG/ML
0.4 INJECTION, SOLUTION INTRAMUSCULAR; INTRAVENOUS; SUBCUTANEOUS EVERY 5 MIN PRN
Status: DISCONTINUED | OUTPATIENT
Start: 2025-01-24 | End: 2025-01-24

## 2025-01-24 RX ORDER — ONDANSETRON 2 MG/ML
INJECTION INTRAMUSCULAR; INTRAVENOUS AS NEEDED
Status: DISCONTINUED | OUTPATIENT
Start: 2025-01-24 | End: 2025-01-24 | Stop reason: SURG

## 2025-01-24 RX ORDER — MORPHINE SULFATE 4 MG/ML
4 INJECTION, SOLUTION INTRAMUSCULAR; INTRAVENOUS EVERY 10 MIN PRN
Status: DISCONTINUED | OUTPATIENT
Start: 2025-01-24 | End: 2025-01-24

## 2025-01-24 RX ADMIN — MIDAZOLAM HYDROCHLORIDE 2 MG: 1 INJECTION INTRAMUSCULAR; INTRAVENOUS at 13:13:00

## 2025-01-24 RX ADMIN — LIDOCAINE HYDROCHLORIDE 5 ML: 10 INJECTION, SOLUTION EPIDURAL; INFILTRATION; INTRACAUDAL; PERINEURAL at 13:13:00

## 2025-01-24 RX ADMIN — ONDANSETRON 4 MG: 2 INJECTION INTRAMUSCULAR; INTRAVENOUS at 13:55:00

## 2025-01-24 RX ADMIN — DEXAMETHASONE SODIUM PHOSPHATE 8 MG: 4 MG/ML VIAL (ML) INJECTION at 13:13:00

## 2025-01-24 NOTE — OPERATIVE REPORT
OPERATIVE REPORT   Date of operation: 1/24/2025  Patient: Bernard Centeno    Pre-operative diagnosis:  Abnormal uterine bleeding  Suspected submucosal fibroid    Post-operative diagnosis: same     Procedure: hysteroscopic myomectomy     Indication: Bernard presented in the outpatient setting with a longstanding history of abnormal uterine bleeding despite normal endometrial sampling. Pelvic ultrasound revealed a submucosal fibroid and she was recommended for hysteroscopic removal.     Surgeon: Lilly Manuel DO  Antibiotics: None  VTE prophylaxis: SCDs  Implants: None  Blood products: None  Estimated blood loss: 10mL  Urine output: None  Complications: none  Fluid deficit: 280mL    Findings:  2cm smooth round lesion along the inferior aspect of the posterior endometrium consistent with submucosal fibroid vs adenomyoma     PROCEDURE    The patient was brought to the operating room and placed on the operating table where anesthesia was induced without issue. She was then placed in dorsal lithotomy position and prepped and draped in the usual sterile fashion.     A speculum was inserted into the vagina and the anterior lip of the cervix grasped with a single tooth tenaculum. The cervix was carefully dilated to 7 using Yvette dilators. The hysteroscope was then advanced through the cervix using sterile saline for distention. The above findings were noted. The TruClear Soft Tissue Mini blade was used to resect the endometrial lesion. All instruments were removed from the vagina and excellent hemostasis was noted.     All instrument, needle and sponge counts were correct at the conclusion of the procedure. The patient tolerated the procedure well and was taken to PACU in stable condition.     Lilly Manuel DO

## 2025-01-24 NOTE — H&P
H&P    HPI:   Bernard Centeno is a 54 year old  female presenting to discuss abnormal uterine bleeding.     She reports heavy menses since . They became more painful at around this time as well. She had an ultrasound in  which she was told was normal. Report not available for review. She continued to bleed heavily and had an EMB which patient states was negative, pathology results not available for review. She was eventually placed on norethindrone for control of her symptoms. She reports bleeding initially improved but she continues to have what appears to be a menstrual period despite taking norethindrone continuously. She also continues to spot, sometimes for most of the month. She reports occasional hot flashes at night. Denies any vulvovaginal symptoms, mood or sleep changes.     Past Medical History:    Migraine with aura    Migraines    PONV (postoperative nausea and vomiting)    Visual impairment    glasses       Past Surgical History:   Procedure Laterality Date                Excis bartholin gland/cyst Left     left bartgolin gland removed    Tubal ligation      Umbilical hernia repair         History reviewed. No pertinent family history.    Medications (Active prior to today's visit):  No current outpatient medications on file.       Allergies:  Allergies[1]    /67 (BP Location: Right arm)   Pulse 66   Temp 98.7 °F (37.1 °C) (Oral)   Resp 16   Ht 66\"   Wt 144 lb (65.3 kg)   LMP 11/15/2024   SpO2 97%   BMI 23.24 kg/m²     PHYSICAL EXAM:   GENERAL: Well developed, well nourished, in no apparent distress  ABDOMEN: Soft, non distended, non tender, no masses, well-healed Pfannenstiel incision   GYNE/:   External Genitalia: normal, no lesions, good perineal support           Vagina: normal mucosa, no lesions, scant brown discharge   Uterus:retroverted, slightly enlarged, non-tender                 Cervix: normal os, no lesions or bleeding                     R/V: normal perineum, no hemorrhoids  EXTREMITIES: nontender without edema     IMAGING       TVUS 12/11/2024     Findings:     Uterus: 9.13 x 5.94 x 4.88cm   Endometrial thickness: 15.90mm     Left ovary:   Length 2.99 cm   Width 2.10 cm   Height 1.45 cm   Volume 4.767     Right ovary:   Length 3.09 cm   Width 2.34 cm   Height 1.74 cm   Volume 6.588     Uterine Fibroids   1. Anterior subserosal 0.89 x 0.88 cm   2. Anterior intramural 0.61 x 0.65 cm   3. Submucosal 2.06 x 1.61 cm     Impression: slightly enlarged uterus with several fibroids, the largest of which is submucosal. The endometrial thickness is 15mm which may be normal in a pre-menopausal female. Bilateral ovaries normal in appearance.     ASSESSMENT/PLAN:       #Submucosal fibroid  -reviewed imaging results c/w submucosal fibroid, discussed this is likely source of her bleeding and this is unlikely to resolve with medical therapy alone  -discussed definitive management via hysteroscopy  -discussed risk of bleeding, infection, uterine perforation causing injury to bowel/bladder/vasculature and that may require diagnostic laparoscopy or more extensive procedure if injury is noted  -discussed anticipated recovery time, all questions answered  -will tentatively schedule for 1/24/2025    Follow up pending lab/ultrasound findings      Lilly Manuel DO               [1]   Allergies  Allergen Reactions    Adhesive Tape UNKNOWN

## 2025-01-24 NOTE — ANESTHESIA POSTPROCEDURE EVALUATION
Patient: Bernard Centeno    Procedure Summary       Date: 01/24/25 Room / Location: [facility] MAIN OR  / [facility] MAIN OR    Anesthesia Start: 1308 Anesthesia Stop: 1408    Procedure: Myomectomy hysteroscopic (Uterus) Diagnosis:       Fibroids, submucosal      Abnormal uterine bleeding      (Fibroids, submucosal [D25.0]Abnormal uterine bleeding [N93.9])    Surgeons: Lilly Manuel DO Anesthesiologist: Everette Bernal MD    Anesthesia Type: general ASA Status: 2            Anesthesia Type: general    Vitals Value Taken Time   /79 01/24/25 1407   Temp 37.6 01/24/25 1408   Pulse 62 01/24/25 1408   Resp 13 01/24/25 1408   SpO2 96 % 01/24/25 1408   Vitals shown include unfiled device data.    [facility] AN Post Evaluation:   Patient Evaluated in PACU  Patient Participation: complete - patient participated  Level of Consciousness: awake  Pain Management: adequate  Airway Patency:patent  Dental exam unchanged from preop  Yes    Nausea/Vomiting: none  Cardiovascular Status: acceptable  Respiratory Status: acceptable  Postoperative Hydration acceptable      Everette Bernal MD  1/24/2025 2:08 PM

## 2025-01-24 NOTE — ANESTHESIA PROCEDURE NOTES
Airway  Date/Time: 1/24/2025 1:13 PM  Urgency: Elective      General Information and Staff    Patient location during procedure: OR  Anesthesiologist: Everette Bernal MD  Performed: anesthesiologist   Performed by: Everette Bernal MD  Authorized by: Everette Bernal MD      Indications and Patient Condition  Indications for airway management: anesthesia  Sedation level: deep  Preoxygenated: yes  Patient position: sniffing  Mask difficulty assessment: 0 - not attempted    Final Airway Details  Final airway type: supraglottic airway      Successful airway: classic  Size 4       Number of attempts at approach: 1    Additional Comments  Atraumatic placement

## 2025-01-24 NOTE — ANESTHESIA PREPROCEDURE EVALUATION
Anesthesia PreOp Note    HPI:     Bernard Centeno is a 54 year old female who presents for preoperative consultation requested by: Lilly Manuel DO    Date of Surgery: 2025    Procedure(s):  Myomectomy hysteroscopic  Indication: Fibroids, submucosal [D25.0]  Abnormal uterine bleeding [N93.9]    Relevant Problems   No relevant active problems       NPO:  Last Liquid Consumption Date: 25  Last Liquid Consumption Time: 07 (water)  Last Solid Consumption Date: 25  Last Solid Consumption Time:   Last Liquid Consumption Date: 25          History Review:  There are no active problems to display for this patient.      Past Medical History:    Migraine with aura    Migraines    PONV (postoperative nausea and vomiting)    Visual impairment    glasses       Past Surgical History:   Procedure Laterality Date                Excis bartholin gland/cyst Left     left bartgolin gland removed    Tubal ligation      Umbilical hernia repair         Prescriptions Prior to Admission[1]  Current Medications and Prescriptions Ordered in Epic[2]    Allergies[3]    History reviewed. No pertinent family history.  Social History     Socioeconomic History    Marital status: Single   Tobacco Use    Smoking status: Never    Smokeless tobacco: Never   Vaping Use    Vaping status: Never Used   Substance and Sexual Activity    Alcohol use: Yes     Comment: socially    Drug use: Never    Sexual activity: Yes   Other Topics Concern    Blood Transfusions No       Available pre-op labs reviewed.             Vital Signs:  Body mass index is 22.6 kg/m².   height is 1.676 m (5' 6\") and weight is 63.5 kg (140 lb).   Vitals:    25 1517   Weight: 63.5 kg (140 lb)   Height: 1.676 m (5' 6\")        Anesthesia Evaluation      No history of anesthetic complications (hx of PONV mild)   Airway   Mallampati: II  TM distance: >3 FB  Neck ROM: full  Dental - Dentition appears grossly intact     Pulmonary -  negative ROS and normal exam   Cardiovascular - negative ROS and normal exam  Exercise tolerance: good    ECG reviewed    Neuro/Psych - negative ROS     GI/Hepatic/Renal - negative ROS     Endo/Other      Comments: Abnormal uterine bleeding  Abdominal  - normal exam                 Anesthesia Plan:   ASA:  2  Plan:   General  Airway:  LMA  Plan Comments: I have discussed the anesthetic plan, major risks and alternatives with the patient and answered all questions.  Minor and major side effects were discussed with patient, including but not limited to: injury to teeth/gums/lips, aspiration, nausea/vomiting postoperatively, anaphylaxis, heart attack, stroke, post-operative ventilation and death. The patient desires to proceed with surgery and anesthesia as planned.       Informed Consent Plan and Risks Discussed With:  Patient      I have informed Bernard Centeno and/or legal guardian or family member of the nature of the anesthetic plan, benefits, risks including possible dental damage if relevant, major complications, and any alternative forms of anesthetic management.   All of the patient's questions were answered to the best of my ability. The patient desires the anesthetic management as planned.  Everette Bernal MD  1/24/2025 12:13 PM  Present on Admission:  **None**           [1]   Medications Prior to Admission   Medication Sig Dispense Refill Last Dose/Taking    Multiple Vitamins-Minerals (MULTI-VITAMIN/MINERALS) Oral Tab Take 1 tablet by mouth daily.   1/23/2025 Morning    cholecalciferol 50 MCG (2000 UT) Oral Cap Take 1 capsule (2,000 Units total) by mouth daily.   1/23/2025 Morning    norethindrone 5 MG Oral Tab Take 2 tablets (10 mg total) by mouth daily. Take 1 tablet (5 mg total) by mouth daily, skipping placebo pills. (Patient taking differently: Take 2 tablets (10 mg total) by mouth every morning. Take 1 tablet (5 mg total) by mouth daily, skipping placebo pills.) 60 tablet 3 1/24/2025 at  7:00 AM    [2]   Current Facility-Administered Medications Ordered in Epic   Medication Dose Route Frequency Provider Last Rate Last Admin    lactated ringers infusion   Intravenous Continuous Dubovis, Lilly, DO        acetaminophen (Tylenol Extra Strength) tab 1,000 mg  1,000 mg Oral Once DubgloriasKellyna, DO         No current [facility]-ordered outpatient medications on file.   [3]   Allergies  Allergen Reactions    Adhesive Tape UNKNOWN

## 2025-02-06 ENCOUNTER — OFFICE VISIT (OUTPATIENT)
Dept: OBGYN CLINIC | Facility: CLINIC | Age: 55
End: 2025-02-06
Payer: COMMERCIAL

## 2025-02-06 VITALS
BODY MASS INDEX: 24.74 KG/M2 | WEIGHT: 148.5 LBS | DIASTOLIC BLOOD PRESSURE: 76 MMHG | SYSTOLIC BLOOD PRESSURE: 126 MMHG | HEIGHT: 65 IN

## 2025-02-06 DIAGNOSIS — N93.9 ABNORMAL UTERINE BLEEDING: Primary | ICD-10-CM

## 2025-02-06 PROCEDURE — 99024 POSTOP FOLLOW-UP VISIT: CPT | Performed by: OBSTETRICS & GYNECOLOGY

## 2025-02-06 NOTE — PROGRESS NOTES
POST-OP VISIT     HPI:   Bernard Centeno is a 54 year old  female presenting for post-operative visit.     Patient underwent hysteroscopic myomectomy on 2025 for AUB related to submucosal fibroid. Her immediate post-operative course has been uncomplicated. She reports scant spotting that is brown. She has continued her norethindrone.     Past Medical History:    Migraine with aura    Migraines    PONV (postoperative nausea and vomiting)    Visual impairment    glasses       Past Surgical History:   Procedure Laterality Date                Excis bartholin gland/cyst Left     left bartgolin gland removed    Tubal ligation      Umbilical hernia repair         No family history on file.    Medications (Active prior to today's visit):  Current Outpatient Medications   Medication Sig Dispense Refill    Multiple Vitamins-Minerals (MULTI-VITAMIN/MINERALS) Oral Tab Take 1 tablet by mouth daily.      cholecalciferol 50 MCG (2000 UT) Oral Cap Take 1 capsule (2,000 Units total) by mouth daily.      norethindrone 5 MG Oral Tab Take 2 tablets (10 mg total) by mouth daily. Take 1 tablet (5 mg total) by mouth daily, skipping placebo pills. (Patient taking differently: Take 2 tablets (10 mg total) by mouth every morning. Take 1 tablet (5 mg total) by mouth daily, skipping placebo pills.) 60 tablet 3       Allergies:  Allergies[1]    LMP 11/15/2024     PHYSICAL EXAM:   GENERAL: Well developed, well nourished, in no apparent distress  EXTREMITIES: nontender without edema     Final Diagnosis:      Fibroids; myomectomy:   Fragments of mature smooth muscle, clinically leiomyoma.  Associated inactive endometrial mucosa.        ASSESSMENT/PLAN:       #Post-op visit  -operative images and pathology reviewed with patient  -recommend break from norethindrone now that source of bleeding has been removed as ongoing progesterone therapy will thin the endometrium and potentially lead to spotting  -discussed  signs of perimenopause, patient will follow up if bleeding becomes bothersome again    Follow up MARELYN     Lilly Manuel DO                 [1]   Allergies  Allergen Reactions    Adhesive Tape UNKNOWN

## 2025-07-07 PROBLEM — D25.0 FIBROIDS, SUBMUCOSAL: Status: RESOLVED | Noted: 2025-01-24 | Resolved: 2025-07-07

## 2025-07-07 PROBLEM — N93.9 ABNORMAL UTERINE BLEEDING: Status: RESOLVED | Noted: 2025-01-24 | Resolved: 2025-07-07

## 2025-07-08 ENCOUNTER — OFFICE VISIT (OUTPATIENT)
Facility: CLINIC | Age: 55
End: 2025-07-08
Payer: COMMERCIAL

## 2025-07-08 VITALS
HEART RATE: 78 BPM | HEIGHT: 66 IN | OXYGEN SATURATION: 98 % | WEIGHT: 141 LBS | DIASTOLIC BLOOD PRESSURE: 70 MMHG | BODY MASS INDEX: 22.66 KG/M2 | SYSTOLIC BLOOD PRESSURE: 120 MMHG

## 2025-07-08 DIAGNOSIS — Z23 NEED FOR VACCINATION: ICD-10-CM

## 2025-07-08 DIAGNOSIS — Z12.83 SKIN CANCER SCREENING: ICD-10-CM

## 2025-07-08 DIAGNOSIS — M25.552 BILATERAL HIP PAIN: ICD-10-CM

## 2025-07-08 DIAGNOSIS — Z00.00 ENCOUNTER FOR GENERAL ADULT MEDICAL EXAMINATION W/O ABNORMAL FINDINGS: Primary | ICD-10-CM

## 2025-07-08 DIAGNOSIS — M67.471 GANGLION CYST OF RIGHT FOOT: ICD-10-CM

## 2025-07-08 DIAGNOSIS — M25.551 BILATERAL HIP PAIN: ICD-10-CM

## 2025-07-08 DIAGNOSIS — Z12.11 SCREENING FOR COLON CANCER: ICD-10-CM

## 2025-07-08 DIAGNOSIS — Z82.49 FAMILY HISTORY OF AORTIC ANEURYSM: ICD-10-CM

## 2025-07-08 PROCEDURE — 90715 TDAP VACCINE 7 YRS/> IM: CPT | Performed by: FAMILY MEDICINE

## 2025-07-08 PROCEDURE — 90471 IMMUNIZATION ADMIN: CPT | Performed by: FAMILY MEDICINE

## 2025-07-08 PROCEDURE — 99203 OFFICE O/P NEW LOW 30 MIN: CPT | Performed by: FAMILY MEDICINE

## 2025-07-08 PROCEDURE — 99386 PREV VISIT NEW AGE 40-64: CPT | Performed by: FAMILY MEDICINE

## 2025-07-08 PROCEDURE — 90677 PCV20 VACCINE IM: CPT | Performed by: FAMILY MEDICINE

## 2025-07-08 PROCEDURE — 90472 IMMUNIZATION ADMIN EACH ADD: CPT | Performed by: FAMILY MEDICINE

## 2025-07-08 NOTE — PROGRESS NOTES
Subjective:   Bernard Centeno is a 55 year old female who presents for Physical     Patient is due for colon cancer screening. Has had cologuard which was normal in 2022. Would like like to proceed this year with colonoscopy.     Patient would also like referral to dermatology for questions about rosacea and for general skin check    Patient has also noted cyst just lateral to first toe on right foot. Has been present for the past 4 months. Nontender. Has noted some increase in size.     Hip pain  Does have hisotry of severe right hip pain. Intermittent. Has seen PT for this. Exercises were helpful. Still has some pain in right hip but now starting to have pain in left hip. Has had these pains for abut 3 years.    This is a strong family history of thoracic aneurysm. Patient has not been screened before. Never smoker. Has had AAA us screening about 3 years ago which was normal.     History/Other:    Chief Complaint Reviewed and Verified  No Further Nursing Notes to   Review  Tobacco Reviewed  Allergies Reviewed  Medications Reviewed    Problem List Reviewed  Medical History Reviewed  Surgical History   Reviewed  OB Status Reviewed  Family History Reviewed  Social History   Reviewed         Tobacco:  She has never smoked tobacco.    Current Medications[1]      Review of Systems:  Review of Systems   Constitutional: Negative.    HENT: Negative.     Eyes: Negative.    Respiratory: Negative.     Cardiovascular: Negative.    Gastrointestinal: Negative.    Genitourinary: Negative.    Musculoskeletal:  Positive for arthralgias (bilateral hip pain).   Skin:         +cyst on right foot   Neurological: Negative.    Psychiatric/Behavioral: Negative.           Objective:   /70   Pulse 78   Ht 5' 6\" (1.676 m)   Wt 141 lb (64 kg)   LMP 01/15/2025   SpO2 98%   BMI 22.76 kg/m²  Estimated body mass index is 22.76 kg/m² as calculated from the following:    Height as of this encounter: 5' 6\" (1.676 m).     Weight as of this encounter: 141 lb (64 kg).  Physical Exam  Vitals and nursing note reviewed.   Constitutional:       General: She is not in acute distress.     Appearance: Normal appearance. She is not ill-appearing.   HENT:      Head: Normocephalic and atraumatic.      Right Ear: Tympanic membrane normal. There is no impacted cerumen.      Left Ear: Tympanic membrane normal. There is no impacted cerumen.      Mouth/Throat:      Mouth: Mucous membranes are moist.      Pharynx: Oropharynx is clear. No oropharyngeal exudate or posterior oropharyngeal erythema.   Eyes:      General:         Right eye: No discharge.         Left eye: No discharge.      Extraocular Movements: Extraocular movements intact.      Pupils: Pupils are equal, round, and reactive to light.   Cardiovascular:      Rate and Rhythm: Normal rate and regular rhythm.      Heart sounds: Normal heart sounds. No murmur heard.     No friction rub. No gallop.   Pulmonary:      Effort: Pulmonary effort is normal.      Breath sounds: Normal breath sounds. No wheezing, rhonchi or rales.   Abdominal:      General: Abdomen is flat. Bowel sounds are normal. There is no distension.      Palpations: Abdomen is soft. There is no mass.      Tenderness: There is no abdominal tenderness. There is no guarding or rebound.   Musculoskeletal:         General: Normal range of motion.      Cervical back: Normal range of motion.      Right lower leg: No edema.      Left lower leg: No edema.   Skin:     General: Skin is warm and dry.      Findings: No rash.      Comments: +mobile soft cystic structure just inferior and medial to right great toe   Neurological:      General: No focal deficit present.      Mental Status: She is alert. Mental status is at baseline.   Psychiatric:         Mood and Affect: Mood normal.         Behavior: Behavior normal.         Assessment & Plan:   1. Encounter for general adult medical examination w/o abnormal findings (Primary)  -     Basic  Metabolic Panel (8); Future; Expected date: 07/08/2025  -     Hemoglobin A1C; Future; Expected date: 07/08/2025  -     Lipid Panel; Future; Expected date: 07/08/2025  -     TSH W Reflex To Free T4; Future; Expected date: 07/08/2025  -     CBC With Differential With Platelet; Future; Expected date: 07/08/2025    Ordered annual labs  Patient is going to establish with OB/GYN for Pap smears    2. Screening for colon cancer  -     [facility] GI Telephone Colon Screen; Future; Expected date: 07/15/2025    Placed order for GI telephone screen as patient is otherwise low risk    3. Need for vaccination  -     Prevnar 20 (PCV20) [24945]  -     TdaP (Adacel, Boostrix) [00182]    Administered by staff today    4. Skin cancer screening  -     Derm Referral - In Network    Placed referral to dermatology for skin cancer screening and for consultation on rosacea    5. Ganglion cyst of right foot  -     Podiatry Referral    Placed referral to podiatry for further evaluation and treatment    6. Bilateral hip pain  -     XR HIP W OR WO PELVIS(MIN 5 VIEWS),BILAT(CPT=73523); Future; Expected date: 07/08/2025    Patient has already participated in physical therapy which was somewhat helpful.  Has not had imaging done before of hips.  Will proceed with hip x-ray.  Pending results will consider referral to orthopedic surgery    7. Family history of aortic aneurysm  Patient has already had screening which was normal.  Do not recommend further screening    Return in about 1 year (around 7/8/2026), or if symptoms worsen or fail to improve.    Cleo Jordan MD, 7/8/2025, 2:56 PM          [1]   Current Outpatient Medications   Medication Sig Dispense Refill    Magnesium 100 MG Oral Tab       Multiple Vitamins-Minerals (MULTI-VITAMIN/MINERALS) Oral Tab Take 1 tablet by mouth daily.      cholecalciferol 50 MCG (2000 UT) Oral Cap Take 1 capsule (2,000 Units total) by mouth daily.      norethindrone 5 MG Oral Tab Take 2 tablets (10 mg total) by mouth  daily. Take 1 tablet (5 mg total) by mouth daily, skipping placebo pills. (Patient taking differently: Take 2 tablets (10 mg total) by mouth every morning. Take 1 tablet (5 mg total) by mouth daily, skipping placebo pills.) 60 tablet 3

## 2025-07-17 ENCOUNTER — NURSE ONLY (OUTPATIENT)
Facility: CLINIC | Age: 55
End: 2025-07-17

## 2025-07-17 DIAGNOSIS — Z12.11 COLON CANCER SCREENING: Primary | ICD-10-CM

## 2025-07-17 DIAGNOSIS — Z80.0 FAMILY HISTORY OF COLON CANCER: ICD-10-CM

## 2025-07-17 NOTE — PROGRESS NOTES
GI Staff:  TCS Colon Screening Orders    Schedule: Colonoscopy 74196 with MAC or IV    Please send split dose Golytely - will send once scheduled ([facility] [place])     Diagnosis: Colon cancer screening Z12.11, Family history of colon cancer Z80.0    Medication Adjustments:   14 days before procedure, hold:   Magnesium   Multiple Vitamins-Minerals   Cholecalciferol   >>>Please inform patient if new medications are started after scheduling procedure they need to call clinic to notify us.

## 2025-07-17 NOTE — PROGRESS NOTES
Meets TCS criteria and appropriate to proceed with scheduling.   Medications, pharmacy, and allergies reviewed.               › Age: 55 y.o.   › MD preference: No preference   › Insurance: [employer]   › Last pcp visit: 7/8/25 - Referred by Dr. Jordan   › Last CBC: N/A   › Last FOBT/Cologuard: January 2021 (Negative)   › H/W: 5'6\" + 140lb   › BMI: 22.6    Telephone Colon Screening Questionnaire Yes No   Are you currently experiencing any GI symptoms [] [x]   If yes, explain     Rectal bleeding [] [x]   Black stool [] [x]   Dysphagia or food \"feeling stuck\" when eating [] [x]   Intractable vomiting [] [x]   Unexplained weight loss [] [x]   First colonoscopy [x] []   Family history of colon cancer  Paternal grandfather  [x] []   Known issues or adverse effects with anesthesia [] [x]   If yes, explain     In the last 12 months, complaints of chest pain or shortness of breath  [] [x]   Referred to a cardiologist?  [] [x]   If yes, explain:      Respiratory: oxygen/MAEGAN/COPD [] [x]   CPAP/BiPAP  [] [x]   History of heart attack or stroke [] [x]   If yes, in the last 12 months?  [] [x]   History of devices (pacemaker/defibrillator/stent placement) [] [x]     Medication Reconciliation  Yes  No   Anticoagulants [] [x]   Diuretics  [] [x]   ACE Inhibitors/ARB's/Combo [] [x]   Diabetic (oral/insulin)  [] [x]   Weight loss (phentermine/vyvanse/saxsenda/etc) [] [x]   Iron/vitamin E/herbal/multivitamin supplements  Magnesium   Multiple Vitamins-Minerals   Cholecalciferol  [x] []   NSAID/ASA  (ex: aspirin, Ibuprofen, naproxen, meloxicam, ketorolac, celecoxib, sulindac, indomethacin)  [] [x]   Marijuana/CBD/vaping use [] [x]

## 2025-07-18 ENCOUNTER — LAB ENCOUNTER (OUTPATIENT)
Dept: LAB | Facility: REFERENCE LAB | Age: 55
End: 2025-07-18
Attending: FAMILY MEDICINE
Payer: COMMERCIAL

## 2025-07-18 ENCOUNTER — RESULTS FOLLOW-UP (OUTPATIENT)
Facility: CLINIC | Age: 55
End: 2025-07-18

## 2025-07-18 ENCOUNTER — HOSPITAL ENCOUNTER (OUTPATIENT)
Dept: GENERAL RADIOLOGY | Age: 55
Discharge: HOME | End: 2025-07-18
Attending: FAMILY MEDICINE
Payer: COMMERCIAL

## 2025-07-18 DIAGNOSIS — M25.551 BILATERAL HIP PAIN: ICD-10-CM

## 2025-07-18 DIAGNOSIS — Z00.00 ENCOUNTER FOR GENERAL ADULT MEDICAL EXAMINATION W/O ABNORMAL FINDINGS: ICD-10-CM

## 2025-07-18 DIAGNOSIS — M25.552 BILATERAL HIP PAIN: ICD-10-CM

## 2025-07-18 LAB
ANION GAP SERPL CALC-SCNC: 9 MMOL/L (ref 0–18)
BASOPHILS # BLD AUTO: 0.07 X10(3) UL (ref 0–0.2)
BASOPHILS NFR BLD AUTO: 1.1 %
BUN BLD-MCNC: 15 MG/DL (ref 9–23)
BUN/CREAT SERPL: 14.7 (ref 10–20)
CALCIUM BLD-MCNC: 9.3 MG/DL (ref 8.7–10.4)
CHLORIDE SERPL-SCNC: 102 MMOL/L (ref 98–112)
CHOLEST SERPL-MCNC: 211 MG/DL (ref ?–200)
CO2 SERPL-SCNC: 28 MMOL/L (ref 21–32)
CREAT BLD-MCNC: 1.02 MG/DL (ref 0.55–1.02)
DEPRECATED RDW RBC AUTO: 43.6 FL (ref 35.1–46.3)
EGFRCR SERPLBLD CKD-EPI 2021: 65 ML/MIN/1.73M2 (ref 60–?)
EOSINOPHIL # BLD AUTO: 0.08 X10(3) UL (ref 0–0.7)
EOSINOPHIL NFR BLD AUTO: 1.2 %
ERYTHROCYTE [DISTWIDTH] IN BLOOD BY AUTOMATED COUNT: 12.6 % (ref 11–15)
EST. AVERAGE GLUCOSE BLD GHB EST-MCNC: 108 MG/DL (ref 68–126)
FASTING PATIENT LIPID ANSWER: YES
FASTING STATUS PATIENT QL REPORTED: YES
GLUCOSE BLD-MCNC: 90 MG/DL (ref 70–99)
HBA1C MFR BLD: 5.4 % (ref ?–5.7)
HCT VFR BLD AUTO: 37.3 % (ref 35–48)
HDLC SERPL-MCNC: 68 MG/DL (ref 40–59)
HGB BLD-MCNC: 12.3 G/DL (ref 12–16)
IMM GRANULOCYTES # BLD AUTO: 0.01 X10(3) UL (ref 0–1)
IMM GRANULOCYTES NFR BLD: 0.2 %
LDLC SERPL CALC-MCNC: 125 MG/DL (ref ?–100)
LYMPHOCYTES # BLD AUTO: 2.05 X10(3) UL (ref 1–4)
LYMPHOCYTES NFR BLD AUTO: 30.9 %
MCH RBC QN AUTO: 31.2 PG (ref 26–34)
MCHC RBC AUTO-ENTMCNC: 33 G/DL (ref 31–37)
MCV RBC AUTO: 94.7 FL (ref 80–100)
MONOCYTES # BLD AUTO: 0.61 X10(3) UL (ref 0.1–1)
MONOCYTES NFR BLD AUTO: 9.2 %
NEUTROPHILS # BLD AUTO: 3.82 X10 (3) UL (ref 1.5–7.7)
NEUTROPHILS # BLD AUTO: 3.82 X10(3) UL (ref 1.5–7.7)
NEUTROPHILS NFR BLD AUTO: 57.4 %
NONHDLC SERPL-MCNC: 143 MG/DL (ref ?–130)
OSMOLALITY SERPL CALC.SUM OF ELEC: 288 MOSM/KG (ref 275–295)
PLATELET # BLD AUTO: 245 10(3)UL (ref 150–450)
POTASSIUM SERPL-SCNC: 4 MMOL/L (ref 3.5–5.1)
RBC # BLD AUTO: 3.94 X10(6)UL (ref 3.8–5.3)
SODIUM SERPL-SCNC: 139 MMOL/L (ref 136–145)
TRIGL SERPL-MCNC: 100 MG/DL (ref 30–149)
TSI SER-ACNC: 2.34 UIU/ML (ref 0.55–4.78)
VLDLC SERPL CALC-MCNC: 18 MG/DL (ref 0–30)
WBC # BLD AUTO: 6.6 X10(3) UL (ref 4–11)

## 2025-07-18 PROCEDURE — 36415 COLL VENOUS BLD VENIPUNCTURE: CPT

## 2025-07-18 PROCEDURE — 73523 X-RAY EXAM HIPS BI 5/> VIEWS: CPT | Performed by: FAMILY MEDICINE

## 2025-07-18 PROCEDURE — 83036 HEMOGLOBIN GLYCOSYLATED A1C: CPT

## 2025-07-18 PROCEDURE — 80061 LIPID PANEL: CPT

## 2025-07-18 PROCEDURE — 80048 BASIC METABOLIC PNL TOTAL CA: CPT

## 2025-07-18 PROCEDURE — 85025 COMPLETE CBC W/AUTO DIFF WBC: CPT

## 2025-07-18 PROCEDURE — 84443 ASSAY THYROID STIM HORMONE: CPT

## 2025-07-18 NOTE — PROGRESS NOTES
Scheduled for:  Colonoscopy 37840  Provider Name:  Dr. Roldan  Date:  10/30/2025  Location:   [facility]  Sedation:  MAC  Time:  10:40 AM - Patient is aware NE will call the day before with arrival time.  Prep:  Golytely  Meds/Allergies Reconciled?:  Physician reviewed   Diagnosis with codes:  Colon cancer screening Z12.11; Fam Hx: Colon cancer Z80.0  Was patient informed to call insurance with codes (Y/N):  Yes, I confirmed [employer] insurance with the patient.   Referral sent?:  Referral was sent at the time of electronic surgical scheduling.   [facility] or [facility] notified?:  I sent an electronic request to Endo Scheduling and received a confirmation today.   Medication Orders:  Hold multivitamins/supplements for two weeks prior to procedure.  Misc Orders:  N/A     Further instructions given by staff:  I discussed the prep instructions with the patient which she verbally understood and is aware that I will send the instructions today.

## 2025-07-18 NOTE — PROGRESS NOTES
GI MAs -     Please send bowel prep to the [facility] in [place] on file.    Thank you!

## 2025-07-18 NOTE — PROGRESS NOTES
Per written protocol, bowel prep sent to pharmacy of choice.     Disp Refills Start End    polyethylene glycol, PEG 3350-KCl-NaBcb-NaCl-NaSulf, 236 g Oral Recon Soln 4000 mL 0 7/18/2025 --    Sig - Route: Take 4,000 mL by mouth As Directed. - Oral    Sent to pharmacy as: PEG 3350-KCl-NaBcb-NaCl-NaSulf 236 GM Oral Solution Reconstituted (Golytely)    E-Prescribing Status: Transmission to pharmacy in progress (7/18/2025 10:05 AM CDT)      Pharmacy    [id number] - [facility] [address] AT [facility], [phone], [phone]

## 2025-07-31 ENCOUNTER — HOSPITAL ENCOUNTER (OUTPATIENT)
Dept: GENERAL RADIOLOGY | Facility: HOSPITAL | Age: 55
Discharge: HOME | End: 2025-07-31
Attending: STUDENT IN AN ORGANIZED HEALTH CARE EDUCATION/TRAINING PROGRAM

## 2025-07-31 ENCOUNTER — OFFICE VISIT (OUTPATIENT)
Dept: PODIATRY CLINIC | Facility: CLINIC | Age: 55
End: 2025-07-31
Payer: COMMERCIAL

## 2025-07-31 DIAGNOSIS — R52 PAIN: ICD-10-CM

## 2025-07-31 DIAGNOSIS — M79.89 SOFT TISSUE MASS: ICD-10-CM

## 2025-07-31 DIAGNOSIS — R52 PAIN: Primary | ICD-10-CM

## 2025-07-31 PROCEDURE — 99204 OFFICE O/P NEW MOD 45 MIN: CPT | Performed by: STUDENT IN AN ORGANIZED HEALTH CARE EDUCATION/TRAINING PROGRAM

## 2025-07-31 PROCEDURE — 73660 X-RAY EXAM OF TOE(S): CPT | Performed by: STUDENT IN AN ORGANIZED HEALTH CARE EDUCATION/TRAINING PROGRAM

## 2025-08-02 ENCOUNTER — PATIENT MESSAGE (OUTPATIENT)
Facility: CLINIC | Age: 55
End: 2025-08-02

## 2025-08-02 DIAGNOSIS — G57.62 MORTON'S NEUROMA OF SECOND INTERSPACE OF LEFT FOOT: Primary | ICD-10-CM

## 2025-08-02 DIAGNOSIS — G57.62 MORTON'S NEUROMA OF THIRD INTERSPACE OF LEFT FOOT: ICD-10-CM

## (undated) DEVICE — GLOVE SUR 7 SENSICARE PI MIC PIP CRM PWD F

## (undated) DEVICE — SOLUTION IRRIG 3000ML 0.9% NACL FLX CONT

## (undated) DEVICE — SOFT TISSUE SHAVER MINI: Brand: TRUCLEAR

## (undated) DEVICE — CANISTER SUCT 3000CC HI FLO DISP FOR FLD MGMT

## (undated) DEVICE — GLOVE SUR 6.5 SENSICARE PI MIC PIP CRM PWD F

## (undated) DEVICE — KIT HYSTEROSCOPIC PROC INCL INFLO OUTFLO TB

## (undated) DEVICE — HYSTEROSCOPY: Brand: MEDLINE INDUSTRIES, INC.